# Patient Record
(demographics unavailable — no encounter records)

---

## 2025-06-13 NOTE — HISTORY OF PRESENT ILLNESS
[FreeTextEntry1] : 56 year old male presents to the office with wife for a c/o right flank pain. Patient reports that discomfort present for a few days and not agitated by movement. He is a patient of Dr Hoenig who he follows for kidney stones. Denies dysuria, frequency, urgency and gross hematuria.